# Patient Record
Sex: FEMALE | Race: BLACK OR AFRICAN AMERICAN | NOT HISPANIC OR LATINO
[De-identification: names, ages, dates, MRNs, and addresses within clinical notes are randomized per-mention and may not be internally consistent; named-entity substitution may affect disease eponyms.]

---

## 2023-04-10 ENCOUNTER — DASHBOARD ENCOUNTERS (OUTPATIENT)
Age: 28
End: 2023-04-10

## 2023-04-10 ENCOUNTER — OFFICE VISIT (OUTPATIENT)
Dept: URBAN - METROPOLITAN AREA CLINIC 48 | Facility: CLINIC | Age: 28
End: 2023-04-10
Payer: MEDICAID

## 2023-04-10 VITALS
WEIGHT: 121 LBS | SYSTOLIC BLOOD PRESSURE: 122 MMHG | DIASTOLIC BLOOD PRESSURE: 83 MMHG | TEMPERATURE: 97.5 F | HEART RATE: 80 BPM | HEIGHT: 61 IN | BODY MASS INDEX: 22.84 KG/M2

## 2023-04-10 DIAGNOSIS — R10.13 EPIGASTRIC PAIN: ICD-10-CM

## 2023-04-10 PROCEDURE — 99204 OFFICE O/P NEW MOD 45 MIN: CPT | Performed by: PHYSICIAN ASSISTANT

## 2023-04-10 RX ORDER — IBUPROFEN 800 MG/1
1 TABLET WITH FOOD OR MILK AS NEEDED TABLET, FILM COATED ORAL
Qty: 15 TABLET | Refills: 0 | Status: ACTIVE | COMMUNITY

## 2023-04-10 RX ORDER — PANTOPRAZOLE SODIUM 40 MG/1
1 TABLET 30 MINUTES PRIOR TO BREAKFAST TABLET, DELAYED RELEASE ORAL ONCE A DAY
Qty: 30 | Refills: 3 | OUTPATIENT
Start: 2023-04-10

## 2023-04-10 RX ORDER — ONDANSETRON 4 MG/1
1 TABLET ON THE TONGUE AND ALLOW TO DISSOLVE TABLET, ORALLY DISINTEGRATING ORAL ONCE A DAY
Qty: 30 TABLET | Status: ACTIVE | COMMUNITY

## 2023-04-10 RX ORDER — CIPROFLOXACIN 500 MG/1
1 TABLET TABLET ORAL
Qty: 14 TABLET | Refills: 0 | Status: ACTIVE | COMMUNITY

## 2023-04-10 RX ORDER — CHLORHEXIDINE GLUCONATE 1.2 MG/ML
USE 15 ML IN THE MOUTH OR THROAT IF NEEDED FOR WOUND CARE FOR UP TO 14 DAYS - MD NOT UNDER PEACHSTAT RINSE ORAL
Qty: 473 MILLILITER | Refills: 0 | Status: ACTIVE | COMMUNITY

## 2023-04-10 RX ORDER — PHENAZOPYRIDINE HYDROCHLORIDE 200 MG/1
1 TABLET AFTER MEALS TABLET ORAL THREE TIMES A DAY
Qty: 6 TABLET | Status: ACTIVE | COMMUNITY

## 2023-04-10 NOTE — HPI-TODAY'S VISIT:
28 y/o female was treated last month for a UTI. She was found to have blood in her urine. Since that time, she developed epigastric pain and nausea without vomiting. She takes Ibuprofen occasionally, but denies regular NSAID use. The pain has improved, but not fully resolved. Today she had some discomfort which felt improved after eating. She has not been on any acid suppression therapy. A stone protocol CT 3/13/23 was negative; U/S 3/29/23 showed borderline gallbladder thickening likely due to underdistention, no gallstones, and hepatomegaly. Labs 3/3/23 showed an unremarkable CBC and CMP.

## 2023-04-12 ENCOUNTER — OFFICE VISIT (OUTPATIENT)
Dept: URBAN - METROPOLITAN AREA CLINIC 44 | Facility: CLINIC | Age: 28
End: 2023-04-12

## 2023-04-12 RX ORDER — ONDANSETRON 4 MG/1
1 TABLET ON THE TONGUE AND ALLOW TO DISSOLVE TABLET, ORALLY DISINTEGRATING ORAL ONCE A DAY
Qty: 30 TABLET | Status: ACTIVE | COMMUNITY

## 2023-04-17 ENCOUNTER — OFFICE VISIT (OUTPATIENT)
Dept: URBAN - METROPOLITAN AREA CLINIC 48 | Facility: CLINIC | Age: 28
End: 2023-04-17

## 2023-05-16 ENCOUNTER — TELEPHONE ENCOUNTER (OUTPATIENT)
Dept: URBAN - METROPOLITAN AREA CLINIC 46 | Facility: CLINIC | Age: 28
End: 2023-05-16

## 2023-05-18 LAB — H PYLORI BREATH TEST: NEGATIVE

## 2023-05-22 ENCOUNTER — OFFICE VISIT (OUTPATIENT)
Dept: URBAN - METROPOLITAN AREA CLINIC 48 | Facility: CLINIC | Age: 28
End: 2023-05-22